# Patient Record
Sex: MALE | Race: WHITE | HISPANIC OR LATINO | URBAN - METROPOLITAN AREA
[De-identification: names, ages, dates, MRNs, and addresses within clinical notes are randomized per-mention and may not be internally consistent; named-entity substitution may affect disease eponyms.]

---

## 2023-02-21 ENCOUNTER — OFFICE VISIT (OUTPATIENT)
Dept: SURGERY | Facility: CLINIC | Age: 43
End: 2023-02-21
Payer: COMMERCIAL

## 2023-02-21 VITALS — WEIGHT: 193 LBS | BODY MASS INDEX: 34.2 KG/M2 | HEIGHT: 63 IN

## 2023-02-21 DIAGNOSIS — Z12.11 SCREEN FOR COLON CANCER: Primary | ICD-10-CM

## 2023-02-21 PROCEDURE — 99203 OFFICE O/P NEW LOW 30 MIN: CPT | Performed by: PHYSICIAN ASSISTANT

## 2023-02-21 RX ORDER — MELATONIN
1 DAILY
COMMUNITY
Start: 2023-02-06

## 2023-02-23 NOTE — PROGRESS NOTES
ASSESSMENT/PLAN:    This is a 43-year-old gentleman presenting for routine screening colonoscopy who also has hemorrhoids.  I discussed managing his hemorrhoids with avoiding constipation using MiraLAX and fiber supplementation as needed as well as avoiding straining and prolonged periods on the toilet.  His hemorrhoids are nontender and not bleeding and do not warrant surgical intervention.  I did discuss using over-the-counter hemorrhoid cream if they do become more of a nuisance for him as well.  The screening colonoscopy I discussed with him, going over the risks and rationale with the risks including but not limited to bleeding, infection, bowel perforation and the need for additional procedures.  Any additional follow-up we discussed with him once results of been reviewed.  All questions were answered he was willing to proceed with all recommendations.    CC:     Screening colonoscopy, hemorrhoids    HPI:    This is a 43-year-old Turkmen-speaking gentleman presenting to the office today at the request of Dr. Konstantin Petersen for consultation.  He presents today to discuss occasional episodes of rectal bleeding that he has noticed from his hemorrhoids.  The blood is almost always on the toilet paper when wiping and occurs after straining with hard bowel movements.  He has not tried any over-the-counter hemorrhoidal cream or suppositories.  He denies having blood mixed in with his bowel movements, changes to his bowel habits, abdominal pain, unexpected weight loss or any other complaints.  He is due for a routine screening colonoscopy as he has never had 1.    ENDOSCOPY:   • Colonoscopy: None    SOCIAL HISTORY:   • Every day tobacco use Discussed risks of surgery with patient and in particular increased risk of wound infection, poor wound healing, hernias (with abdominal surgery) and post-operative pulmonary complications associated with smoking.   • Occasional alcohol use    FAMILY HISTORY:    • Colorectal  "cancer: None    PREVIOUS ABDOMINAL SURGERY    • Appendectomy    OTHER SURGERY  Past Surgical History:   Procedure Laterality Date   • APPENDECTOMY  Año 1994       PAST MEDICAL HISTORY:    Past Medical History:   Diagnosis Date   • Clotting disorder (HCC) Diciembre    Sangrando al defecar   • Rectal bleeding Diciembre       MEDICATIONS:     Current Outpatient Medications:   •  cholecalciferol (VITAMIN D3) 25 MCG (1000 UT) tablet, Take 1 tablet by mouth Daily., Disp: , Rfl:     ALLERGIES:   No Known Allergies    PHYSICAL EXAM:   • Constitutional: Well-developed well-nourished, no acute distress  • Vital signs:   o Height 63\"  o Weight 183  o BMI 34.2  • Neck: Supple, no palpable mass, trachea midline  • Respiratory: Clear to auscultation, normal inspiratory effort  • Cardiovascular: Regular rate, no murmur, no carotid bruit  • Gastrointestinal: Soft, nontender  • Rectal exam: Hemorrhoidal tissue, nonthrombosed, no cheri blood  • Psychiatric: Alert and oriented ×3, normal affect       Rickie Newby PA-C    "

## 2023-03-21 ENCOUNTER — ANESTHESIA (OUTPATIENT)
Dept: GASTROENTEROLOGY | Facility: HOSPITAL | Age: 43
End: 2023-03-21
Payer: COMMERCIAL

## 2023-03-21 ENCOUNTER — HOSPITAL ENCOUNTER (OUTPATIENT)
Facility: HOSPITAL | Age: 43
Setting detail: HOSPITAL OUTPATIENT SURGERY
Discharge: HOME | End: 2023-03-21
Attending: SURGERY | Admitting: SURGERY
Payer: COMMERCIAL

## 2023-03-21 ENCOUNTER — ANESTHESIA EVENT (OUTPATIENT)
Dept: GASTROENTEROLOGY | Facility: HOSPITAL | Age: 43
End: 2023-03-21
Payer: COMMERCIAL

## 2023-03-21 VITALS
DIASTOLIC BLOOD PRESSURE: 87 MMHG | WEIGHT: 188 LBS | RESPIRATION RATE: 16 BRPM | BODY MASS INDEX: 33.3 KG/M2 | SYSTOLIC BLOOD PRESSURE: 121 MMHG | OXYGEN SATURATION: 97 % | HEART RATE: 87 BPM

## 2023-03-21 PROBLEM — K64.8 INTERNAL HEMORRHOIDS: Status: ACTIVE | Noted: 2023-03-21

## 2023-03-21 PROBLEM — Z12.11 SCREEN FOR COLON CANCER: Status: RESOLVED | Noted: 2023-02-21 | Resolved: 2023-03-21

## 2023-03-21 PROCEDURE — 25010000002 PROPOFOL 10 MG/ML EMULSION: Performed by: NURSE ANESTHETIST, CERTIFIED REGISTERED

## 2023-03-21 PROCEDURE — 94799 UNLISTED PULMONARY SVC/PX: CPT

## 2023-03-21 PROCEDURE — 94761 N-INVAS EAR/PLS OXIMETRY MLT: CPT

## 2023-03-21 PROCEDURE — 94640 AIRWAY INHALATION TREATMENT: CPT

## 2023-03-21 RX ORDER — PROPOFOL 10 MG/ML
VIAL (ML) INTRAVENOUS AS NEEDED
Status: DISCONTINUED | OUTPATIENT
Start: 2023-03-21 | End: 2023-03-21 | Stop reason: SURG

## 2023-03-21 RX ORDER — SODIUM CHLORIDE, SODIUM LACTATE, POTASSIUM CHLORIDE, CALCIUM CHLORIDE 600; 310; 30; 20 MG/100ML; MG/100ML; MG/100ML; MG/100ML
1000 INJECTION, SOLUTION INTRAVENOUS CONTINUOUS
Status: DISCONTINUED | OUTPATIENT
Start: 2023-03-21 | End: 2023-03-21 | Stop reason: HOSPADM

## 2023-03-21 RX ORDER — IPRATROPIUM BROMIDE AND ALBUTEROL SULFATE 2.5; .5 MG/3ML; MG/3ML
3 SOLUTION RESPIRATORY (INHALATION) ONCE
Status: COMPLETED | OUTPATIENT
Start: 2023-03-21 | End: 2023-03-21

## 2023-03-21 RX ORDER — LIDOCAINE HYDROCHLORIDE 20 MG/ML
INJECTION, SOLUTION INFILTRATION; PERINEURAL AS NEEDED
Status: DISCONTINUED | OUTPATIENT
Start: 2023-03-21 | End: 2023-03-21 | Stop reason: SURG

## 2023-03-21 RX ORDER — SODIUM CHLORIDE 0.9 % (FLUSH) 0.9 %
10 SYRINGE (ML) INJECTION AS NEEDED
Status: DISCONTINUED | OUTPATIENT
Start: 2023-03-21 | End: 2023-03-21 | Stop reason: HOSPADM

## 2023-03-21 RX ADMIN — PROPOFOL 200 MCG/KG/MIN: 10 INJECTION, EMULSION INTRAVENOUS at 08:07

## 2023-03-21 RX ADMIN — IPRATROPIUM BROMIDE AND ALBUTEROL SULFATE 3 ML: 2.5; .5 SOLUTION RESPIRATORY (INHALATION) at 09:32

## 2023-03-21 RX ADMIN — SODIUM CHLORIDE, POTASSIUM CHLORIDE, SODIUM LACTATE AND CALCIUM CHLORIDE 1000 ML: 600; 310; 30; 20 INJECTION, SOLUTION INTRAVENOUS at 07:43

## 2023-03-21 RX ADMIN — SODIUM CHLORIDE, POTASSIUM CHLORIDE, SODIUM LACTATE AND CALCIUM CHLORIDE: 600; 310; 30; 20 INJECTION, SOLUTION INTRAVENOUS at 08:23

## 2023-03-21 RX ADMIN — LIDOCAINE HYDROCHLORIDE 60 MG: 20 INJECTION, SOLUTION INFILTRATION; PERINEURAL at 08:06

## 2023-03-21 RX ADMIN — PROPOFOL 100 MG: 10 INJECTION, EMULSION INTRAVENOUS at 08:06

## 2023-03-21 NOTE — ANESTHESIA PREPROCEDURE EVALUATION
Anesthesia Evaluation     Patient summary reviewed and Nursing notes reviewed   NPO Solid Status: > 8 hours  NPO Liquid Status: > 4 hours           Airway   Mallampati: II  Neck ROM: full  No difficulty expected  Dental - normal exam     Pulmonary     breath sounds clear to auscultation  (+) a smoker Current,   Cardiovascular     Rhythm: regular        Neuro/Psych  GI/Hepatic/Renal/Endo    (+)  GI bleeding ,     Musculoskeletal     Abdominal   (+) obese,    Substance History      OB/GYN          Other                        Anesthesia Plan    ASA 2     MAC     intravenous induction     Anesthetic plan, risks, benefits, and alternatives have been provided, discussed and informed consent has been obtained with: patient.        CODE STATUS:

## 2023-03-21 NOTE — ANESTHESIA POSTPROCEDURE EVALUATION
Patient: Sundar Rosa    Procedure Summary     Date: 03/21/23 Room / Location:  [facility]    Anesthesia Start: 0759 Anesthesia Stop: 0833    Procedure: COLONOSCOPY to cecum Diagnosis:       Screen for colon cancer      (Screen for colon cancer [Z12.11])    Surgeons: Aquilino Napoles MD Provider: Hal Caro MD    Anesthesia Type: MAC ASA Status: 2          Anesthesia Type: MAC    Vitals  Vitals Value Taken Time   /87 03/21/23 0939   Temp     Pulse 87 03/21/23 0939   Resp 16 03/21/23 0939   SpO2 97 % 03/21/23 0939           Post Anesthesia Care and Evaluation    Patient location during evaluation: bedside  Patient participation: complete - patient participated  Level of consciousness: sleepy but conscious  Pain score: 0  Pain management: adequate    Airway patency: patent  Anesthetic complications: No anesthetic complications    Cardiovascular status: acceptable  Respiratory status: acceptable  Hydration status: acceptable    Comments: /87 (BP Location: Left arm, Patient Position: Lying)   Pulse 87   Resp 16   Wt 85.3 kg (188 lb)   SpO2 97%   BMI 33.30 kg/m²

## 2023-03-21 NOTE — DISCHARGE INSTRUCTIONS

## 2023-06-06 ENCOUNTER — OFFICE VISIT (OUTPATIENT)
Dept: SURGERY | Facility: CLINIC | Age: 43
End: 2023-06-06
Payer: COMMERCIAL

## 2023-06-06 VITALS
SYSTOLIC BLOOD PRESSURE: 136 MMHG | HEIGHT: 63 IN | HEART RATE: 105 BPM | OXYGEN SATURATION: 99 % | BODY MASS INDEX: 31.89 KG/M2 | DIASTOLIC BLOOD PRESSURE: 86 MMHG | WEIGHT: 180 LBS

## 2023-06-06 RX ORDER — ATORVASTATIN CALCIUM 20 MG/1
20 TABLET, FILM COATED ORAL DAILY
Qty: 30 TABLET | Refills: 5 | COMMUNITY
Start: 2023-02-27 | End: 2023-08-26

## 2023-06-06 RX ORDER — POLYETHYLENE GLYCOL 3350 17 G/17G
17 POWDER, FOR SOLUTION ORAL DAILY
Qty: 60 PACKET | Refills: 2 | Status: SHIPPED | OUTPATIENT
Start: 2023-06-06

## 2023-06-06 RX ORDER — AMOXICILLIN 250 MG
1 CAPSULE ORAL DAILY
Qty: 60 TABLET | Refills: 2 | Status: SHIPPED | OUTPATIENT
Start: 2023-06-06

## 2023-06-06 NOTE — PROGRESS NOTES
"CC: Rectal bleeding follow-up     HPI: Patient is a very pleasant 43-year-old male that is here today for follow-up because of rectal bleeding.  He reports that after having colonoscopy he has been having intermittent episodes of rectal bleeding that consists mostly of bright red blood per rectum that happen usually after bowel movement.  Bleeding last for several days and usually resolved by itself.  He reports having 1 bowel movement per day that is usually hard.  He has been taking Metamucil supplementation.  Denies any abdominal pain nausea vomiting.  No other issues.  He has changed his diet and try to eat less carbohydrates and more fiber.  He has lost 14 pounds since last visit.     PMH: Rectal bleeding, hyperlipidemia, vitamin D deficiency     PSH: Appendectomy    MEDS: Vitamin D, Lipitor     ALL: No known drug allergies    FH and SH: No family history of colon cancer or inflammatory bowel disease.  Denies smoking drinking or taking any drugs     ROS:   Constitutional: denies any weight changes, fatigue or weakness.  HEENT: Denies hearing loss and rhinorrhea  Cardiovascular: denies chest pain, palpitations, edemas.  Respiratory: denies cough, sputum, SOB.  Gastrointestinal: denies N&V, abd pain, diarrhea, reports constipation  Genitourinary: denies dysuria, frequency.  Endocrine: denies cold intolerance, lethargy and flushing.  Hem: denies excessive bruising and postop bleeding.  Musculoskeletal: denies weakness, joint swelling, pain or stiffness.  Neuro: denies seizures, CVA, paresthesia, or peripheral neuropathy.   Skin: denies change in nevi, rashes, masses.     PE:   Vitals:    06/06/23 1316   BP: 136/86   BP Location: Left arm   Patient Position: Sitting   Cuff Size: Large Adult   Pulse: 105   SpO2: 99%   Weight: 81.6 kg (180 lb)   Height: 160 cm (63\")     Body mass index is 31.89 kg/m².   Alert and oriented ×3, no acute distress.  Head is normocephalic and atraumatic.  Neck is supple there is no " thyromegaly or lymphadenopathy  Chest is clear bilaterally there is no added sounds  Regular rate and rhythm, no murmurs  Abdomen is soft and nontender, is nondistended, bowel sounds are positive.  There is no rebound or guarding is and there is no peritoneal signs.  No clubbing cyanosis or edema in lower or upper extremities    Diagnostic studies:   Labs 2/3/2023:  Hemoglobin 15.3, white cell count 8.5, CMP within normal limits  Colonoscopy from 3/21/2023 was reviewed by me.  There is evidence of internal hemorrhoids and skin tags.     Assessment and plan    The patient is a very pleasant rectal bleeding and constipation, he had colonoscopy consistent with internal hemorrhoids.  Discussed with the patient that I recommend that he start taking stool softeners and MiraLAX to try to have 1 or 2 bowel movements per day.  If persistent bleeding then I recommend that he follow-up with me to discuss hemorrhoidectomy versus hemorrhoidal banding  Patient to follow-up in my office in 2 months    BMI is >= 30 and <35. (Class 1 Obesity). The following options were offered after discussion;: exercise counseling/recommendations and nutrition counseling/recommendations      Aquilino Napoles MD  General, Minimally Invasive and Endoscopic Surgery  [facility]     [address], [address]  [place], [place], [address]  P: [phone]  F: [phone]

## 2024-09-27 ENCOUNTER — OFFICE VISIT (OUTPATIENT)
Dept: SURGERY | Facility: CLINIC | Age: 44
End: 2024-09-27
Payer: COMMERCIAL

## 2024-09-27 VITALS
BODY MASS INDEX: 28.35 KG/M2 | WEIGHT: 160 LBS | SYSTOLIC BLOOD PRESSURE: 124 MMHG | DIASTOLIC BLOOD PRESSURE: 76 MMHG | HEIGHT: 63 IN

## 2024-09-27 DIAGNOSIS — K62.5 RECTAL BLEEDING: Primary | ICD-10-CM

## 2024-09-27 DIAGNOSIS — K64.8 INTERNAL HEMORRHOID: ICD-10-CM

## 2024-09-27 PROCEDURE — 99214 OFFICE O/P EST MOD 30 MIN: CPT | Performed by: SURGERY

## 2024-09-27 RX ORDER — AMOXICILLIN 250 MG
1 CAPSULE ORAL DAILY
Qty: 60 TABLET | Refills: 2 | Status: SHIPPED | OUTPATIENT
Start: 2024-09-27

## 2024-10-31 ENCOUNTER — HOSPITAL ENCOUNTER (OUTPATIENT)
Facility: HOSPITAL | Age: 44
Setting detail: HOSPITAL OUTPATIENT SURGERY
Discharge: HOME | End: 2024-10-31
Attending: SURGERY | Admitting: SURGERY
Payer: COMMERCIAL

## 2024-10-31 ENCOUNTER — ANESTHESIA EVENT (OUTPATIENT)
Dept: GASTROENTEROLOGY | Facility: HOSPITAL | Age: 44
End: 2024-10-31
Payer: COMMERCIAL

## 2024-10-31 ENCOUNTER — ANESTHESIA (OUTPATIENT)
Dept: GASTROENTEROLOGY | Facility: HOSPITAL | Age: 44
End: 2024-10-31
Payer: COMMERCIAL

## 2024-10-31 VITALS
OXYGEN SATURATION: 98 % | HEIGHT: 64 IN | HEART RATE: 60 BPM | RESPIRATION RATE: 16 BRPM | WEIGHT: 160 LBS | BODY MASS INDEX: 27.31 KG/M2 | SYSTOLIC BLOOD PRESSURE: 112 MMHG | DIASTOLIC BLOOD PRESSURE: 73 MMHG

## 2024-10-31 DIAGNOSIS — K62.5 RECTAL BLEEDING: ICD-10-CM

## 2024-10-31 DIAGNOSIS — K64.8 INTERNAL HEMORRHOID: ICD-10-CM

## 2024-10-31 PROCEDURE — 25010000002 PROPOFOL 1000 MG/100ML EMULSION: Performed by: NURSE ANESTHETIST, CERTIFIED REGISTERED

## 2024-10-31 PROCEDURE — 45350 SGMDSC W/BAND LIGATION: CPT | Performed by: SURGERY

## 2024-10-31 PROCEDURE — 25010000002 LIDOCAINE 2% SOLUTION: Performed by: NURSE ANESTHETIST, CERTIFIED REGISTERED

## 2024-10-31 PROCEDURE — 25810000003 LACTATED RINGERS PER 1000 ML: Performed by: SURGERY

## 2024-10-31 PROCEDURE — S0260 H&P FOR SURGERY: HCPCS | Performed by: SURGERY

## 2024-10-31 RX ORDER — LIDOCAINE HYDROCHLORIDE 20 MG/ML
INJECTION, SOLUTION INFILTRATION; PERINEURAL AS NEEDED
Status: DISCONTINUED | OUTPATIENT
Start: 2024-10-31 | End: 2024-10-31 | Stop reason: SURG

## 2024-10-31 RX ORDER — SODIUM CHLORIDE, SODIUM LACTATE, POTASSIUM CHLORIDE, CALCIUM CHLORIDE 600; 310; 30; 20 MG/100ML; MG/100ML; MG/100ML; MG/100ML
30 INJECTION, SOLUTION INTRAVENOUS CONTINUOUS
Status: DISCONTINUED | OUTPATIENT
Start: 2024-10-31 | End: 2024-10-31 | Stop reason: HOSPADM

## 2024-10-31 RX ORDER — POLYETHYLENE GLYCOL 3350 17 G/17G
17 POWDER, FOR SOLUTION ORAL DAILY
Qty: 60 PACKET | Refills: 0 | Status: SHIPPED | OUTPATIENT
Start: 2024-10-31

## 2024-10-31 RX ORDER — ACETAMINOPHEN 500 MG
1000 TABLET ORAL 3 TIMES DAILY
Qty: 30 TABLET | Refills: 0 | Status: SHIPPED | OUTPATIENT
Start: 2024-10-31 | End: 2024-11-05

## 2024-10-31 RX ORDER — PROPOFOL 10 MG/ML
INJECTION, EMULSION INTRAVENOUS AS NEEDED
Status: DISCONTINUED | OUTPATIENT
Start: 2024-10-31 | End: 2024-10-31 | Stop reason: SURG

## 2024-10-31 RX ADMIN — PROPOFOL INJECTABLE EMULSION 250 MCG/KG/MIN: 10 INJECTION, EMULSION INTRAVENOUS at 09:26

## 2024-10-31 RX ADMIN — PROPOFOL INJECTABLE EMULSION 70 MG: 10 INJECTION, EMULSION INTRAVENOUS at 09:25

## 2024-10-31 RX ADMIN — LIDOCAINE HYDROCHLORIDE 60 MG: 20 INJECTION, SOLUTION INFILTRATION; PERINEURAL at 09:23

## 2024-10-31 RX ADMIN — SODIUM CHLORIDE, SODIUM LACTATE, POTASSIUM CHLORIDE, CALCIUM CHLORIDE 30 ML/HR: 20; 30; 600; 310 INJECTION, SOLUTION INTRAVENOUS at 08:56

## 2024-10-31 NOTE — OP NOTE
Date of procedure: 10/31/2024    Primary Surgeon: Dr. Napoles    Anesthesia: MAC    Preoperative diagnosis: Rectal bleeding, internal hemorrhoids    Postoperative diagnosis: Same    Procedure performed:   -Flexible sigmoidoscopy and hemorrhoidal banding x 4    Specimens: None    Findings: Large internal hemorrhoids grade 1    Condition of patient: Stable to recovery    Indications: 44-year-old male with internal hemorrhoids and rectal bleeding that persist despite conservative management.  We discussed with the patient about treatment options and I recommend that he undergoes flexible sigmoidoscopy with hemorrhoidal banding.  Risk and benefits of procedure including bleeding, infection, pelvic sepsis, chronic pain discussed in detail with the patient that verbalized understanding and agreed with the plan    Description: Patient was taken to the procedure room where he was placed on the procedure room table in the left lateral decubitus.  Timeout was performed the patient was correctly notified.  Digital rectal examination was performed.  Flexible sigmoidoscopy was performed and the scope was advanced all the way up to the distal descending colon.  The sigmoid was normal.  On retroflexion there was evidence of large internal hemorrhoids.  Endoscopic  was attached to the scope and 4 different bands were placed at 4 different hemorrhoidal location above the dentate line.  There was minimal bleeding.  Patient did not have any pain.  Scope was withdrawn and patient was taken to the recovery in stable condition.    Aquilino Napoles MD, FACS  General, Minimally Invasive and Endoscopic Surgery  [facility]    [address] [place] [address], [address]  [place], [place], [address]  P: [phone]  F: [phone]

## 2024-10-31 NOTE — DISCHARGE INSTRUCTIONS
For the next 24 hours patient needs to be with a responsible adult.    For 24 hours DO NOT drive, operate machinery, appliances, drink alcohol, make important decisions or sign legal documents.    Start with a light or bland diet if you are feeling sick to your stomach otherwise advance to regular diet as tolerated.    Follow recommendations on procedure report if provided by your doctor.    Call Dr Napoles for problems [phone]    Problems may include but not limited to: large amounts of bleeding, trouble breathing, repeated vomiting, severe unrelieved pain, fever or chills.

## 2024-10-31 NOTE — ANESTHESIA PREPROCEDURE EVALUATION
Anesthesia Evaluation     NPO Solid Status: > 8 hours             Airway   Mallampati: II  TM distance: >3 FB  Neck ROM: full  Dental - normal exam     Pulmonary - normal exam   Cardiovascular - normal exam    (-) hypertension, past MI      Neuro/Psych  GI/Hepatic/Renal/Endo      Musculoskeletal     Abdominal    Substance History      OB/GYN          Other                    Anesthesia Plan    ASA 2     MAC       Anesthetic plan, risks, benefits, and alternatives have been provided, discussed and informed consent has been obtained with: patient.    CODE STATUS:

## 2024-10-31 NOTE — ANESTHESIA POSTPROCEDURE EVALUATION
Patient: Sundar Rosa    Procedure Summary       Date: 10/31/24 Room / Location:  [facility]    Anesthesia Start: 0921 Anesthesia Stop: 0951    Procedures:       HEMORRHOID BANDING X4 (Rectum)      FLEXIBLE SIGMOIDOSCOPY Diagnosis:       Rectal bleeding      Internal hemorrhoid      (Rectal bleeding [K62.5])      (Internal hemorrhoid [K64.8])    Surgeons: Aquilino Napoles MD Provider: Riley Prather MD    Anesthesia Type: MAC ASA Status: 2            Anesthesia Type: MAC    Vitals  Vitals Value Taken Time   /73 10/31/24 1013   Temp     Pulse 61 10/31/24 1016   Resp 16 10/31/24 1013   SpO2 98 % 10/31/24 1015   Vitals shown include unfiled device data.        Post Anesthesia Care and Evaluation    Patient location during evaluation: bedside  Pain management: adequate    Airway patency: patent  Anesthetic complications: No anesthetic complications    Cardiovascular status: acceptable  Respiratory status: acceptable  Hydration status: acceptable

## 2024-10-31 NOTE — H&P
"No changes from h&p 9/27/2024    CC: Rectal bleeding follow-up, internal hemorrhoids     HPI: The patient is a very pleasant 44-year-old male that is here today for follow-up due to rectal bleeding.  The patient has been having intermittent episodes of rectal bleeding that consists bright red blood per rectum.  Bleeding happens with bowel movements.  He denies any pain.  He has been on a bowel regimen and has not noticed any change on his rectal bleeding.  He reports regular bowel movements that are soft.     PMH: Rectal bleeding, hyperlipidemia, vitamin D deficiency     PSH:   - Appendectomy  - Colonoscopy 3/21/2023     MEDS: Vitamin D, Lipitor, Metamucil     ALL: No known drug allergies     FH and SH: No family history of colon cancer or inflammatory bowel disease.  Denies smoking drinking or taking any drugs     ROS:   Denies persistent weight loss     PE:   Vitals       Vitals:     09/27/24 0912   BP: 124/76   BP Location: Left arm   Patient Position: Sitting   Weight: 72.6 kg (160 lb)   Height: 160 cm (63\")         Body mass index is 28.34 kg/m².   Alert and oriented ×3, no acute distress.  Perianal examination reveals residual skin tag.  No evidence of external hemorrhoids  No clubbing cyanosis or edema in lower or upper extremities  BMI is >= 25 and <30. (Overweight) The following options were offered after discussion;: weight loss educational material (shared in after visit summary) and exercise counseling/recommendations        Diagnostic studies:   Colonoscopy report 3/21/2023 was reviewed by me  Skin tags and internal hemorrhoids nonbleeding     Assessment and plan     The patient is a very pleasant 44-year-old male with persistent recurrent rectal bleeding and internal hemorrhoids.  He has normal bowel function and has been consistently taking fiber supplementation without any improvement of his symptoms.  Discussed with the patient about further step.  I recommend that he undergoes flexible " sigmoidoscopy and hemorrhoidal banding under moderate sedation.  Risk and benefits of procedure including bleeding, rectal discomfort and pain, pelvic sepsis discussed in detail with the patient that verbalized understanding and agreed with the plan.

## 2024-11-19 ENCOUNTER — OFFICE VISIT (OUTPATIENT)
Dept: SURGERY | Facility: CLINIC | Age: 44
End: 2024-11-19
Payer: COMMERCIAL

## 2024-11-19 VITALS
BODY MASS INDEX: 28.13 KG/M2 | HEART RATE: 109 BPM | OXYGEN SATURATION: 97 % | HEIGHT: 64 IN | WEIGHT: 164.8 LBS | SYSTOLIC BLOOD PRESSURE: 130 MMHG | DIASTOLIC BLOOD PRESSURE: 94 MMHG

## 2024-11-19 DIAGNOSIS — Z09 POSTOP CHECK: Primary | ICD-10-CM

## 2024-11-19 PROCEDURE — 99213 OFFICE O/P EST LOW 20 MIN: CPT | Performed by: SURGERY

## 2024-11-19 NOTE — PROGRESS NOTES
"CC: Follow-up after flexible sigmoidoscopy and hemorrhoidal banding     HPI: Patient is a very pleasant 44-year-old male status post flex sig and hemorrhoidal banding on 10/31/2024.  Patient states he is doing great and denies any complaint.  No more rectal bleeding.     PMH: Rectal bleeding, hyperlipidemia, vitamin D deficiency     PSH:   - Appendectomy  - Colonoscopy 3/21/2023  - Flexible sigmoidoscopy and hemorrhoidal banding 10/31/2024      MEDS: Vitamin D, Lipitor, Metamucil     ALL: No known drug allergies     FH and SH: No family history of colon cancer or inflammatory bowel disease.  Denies smoking drinking or taking any drugs     ROS:   As per HPI     PE:   Vitals:    11/19/24 1303   BP: 130/94   Pulse: 109   SpO2: 97%   Weight: 74.8 kg (164 lb 12.8 oz)   Height: 162.6 cm (64\")     Body mass index is 28.29 kg/m².   Alert and oriented ×3, no acute distress.  Head is normocephalic and atraumatic.  Neck is supple  Breathing comfortable perianal area without any abnormality    Diagnostic studies: None     Assessment and plan    The patient is a very pleasant 44-year-old male status post flex sig and hemorrhoidal banding.  He is doing great and has not had any rectal bleeding since the procedure.  Discussed with the patient about the need to continue fiber supplementation stool softeners and MiraLAX as needed to prevent constipation.  Patient to have colonoscopy in 10 years, 2033            Aquilino Napoles MD  General, Minimally Invasive and Endoscopic Surgery  [facility]     [address] [place] [address], [address]  [place], [place], [facility]  P: [phone]  F: [phone]    "

## 2024-12-10 ENCOUNTER — TELEPHONE (OUTPATIENT)
Dept: SURGERY | Facility: CLINIC | Age: 44
End: 2024-12-10
Payer: COMMERCIAL

## 2024-12-10 RX ORDER — AMOXICILLIN 250 MG
1 CAPSULE ORAL DAILY
Qty: 60 TABLET | Refills: 2 | Status: SHIPPED | OUTPATIENT
Start: 2024-12-10

## 2024-12-10 RX ORDER — POLYETHYLENE GLYCOL 3350 17 G/17G
17 POWDER, FOR SOLUTION ORAL DAILY
Qty: 60 PACKET | Refills: 0 | Status: SHIPPED | OUTPATIENT
Start: 2024-12-10

## 2024-12-10 NOTE — TELEPHONE ENCOUNTER
Called the  line, they reached out to the pt and confirmed that the pt does want a refill of the Miralax powder and sennosides-docusate. I will sent these rx to Dr. Napoles.

## (undated) DEVICE — ADAPT CLN BIOGUARD AIR/H2O DISP

## (undated) DEVICE — SENSR O2 OXIMAX FNGR A/ 18IN NONSTR

## (undated) DEVICE — KT ORCA ORCAPOD DISP STRL

## (undated) DEVICE — LN SMPL CO2 SHTRM SD STREAM W/M LUER

## (undated) DEVICE — MULTIPLE BAND LIGATOR: Brand: SPEEDBAND SUPERVIEW SUPER 7

## (undated) DEVICE — TUBING, SUCTION, 1/4" X 10', STRAIGHT: Brand: MEDLINE

## (undated) DEVICE — CANN O2 ETCO2 FITS ALL CONN CO2 SMPL A/ 7IN DISP LF

## (undated) DEVICE — RESUSCITATOR,MANUAL,ADLT,MASK,TUBE RES: Brand: MEDLINE INDUSTRIES, INC.